# Patient Record
Sex: FEMALE | Race: WHITE | URBAN - METROPOLITAN AREA
[De-identification: names, ages, dates, MRNs, and addresses within clinical notes are randomized per-mention and may not be internally consistent; named-entity substitution may affect disease eponyms.]

---

## 2018-02-13 ENCOUNTER — CARE COORDINATION (OUTPATIENT)
Dept: CARDIOLOGY | Facility: CLINIC | Age: 53
End: 2018-02-13

## 2018-02-13 NOTE — PROGRESS NOTES
"Per staff msg from call center:  \"    ----- Message -----      From: Lynne Garcia      Sent: 2/9/2018  12:50 PM        To: Cardiology Ht Failure [facility]-   Subject:  Possible new Pt family history o*     Type of call: Other     Other: New HF Patient   Notes: Pt calling wanting to schedule with Dr. Rodriguez for HF. Per pt Dr. Rodriguez has followed her Family [facility], almost all family members are seen by Dr. Rodriguez. All pt Cardiac Records are at the [facility] ph. [phone].   Patient call back number: [phone]     Thank You,   Lynne Garcia      All cardiac testing ok per [facility] records, but Dr. Rodriguez is ok to see her.  Scheduling will call to arrange, offered her 3/1 at 230 initially.  "